# Patient Record
Sex: FEMALE | Race: NATIVE HAWAIIAN OR OTHER PACIFIC ISLANDER
[De-identification: names, ages, dates, MRNs, and addresses within clinical notes are randomized per-mention and may not be internally consistent; named-entity substitution may affect disease eponyms.]

---

## 2019-07-11 ENCOUNTER — HOSPITAL ENCOUNTER (OUTPATIENT)
Dept: HOSPITAL 67 - LABW | Age: 44
Discharge: HOME | End: 2019-07-11
Attending: PLASTIC SURGERY
Payer: COMMERCIAL

## 2019-07-11 DIAGNOSIS — Z01.812: Primary | ICD-10-CM

## 2019-07-11 DIAGNOSIS — Z01.810: ICD-10-CM

## 2019-07-11 DIAGNOSIS — Z01.818: ICD-10-CM

## 2019-07-11 LAB
PLATELET # BLD: 241 K/UL (ref 152–353)
POTASSIUM SERPL-SCNC: 4.5 MMOL/L (ref 3.6–5.2)
SODIUM SERPL-SCNC: 139 MMOL/L (ref 136–145)

## 2019-07-11 PROCEDURE — 36415 COLL VENOUS BLD VENIPUNCTURE: CPT

## 2019-07-11 PROCEDURE — 85027 COMPLETE CBC AUTOMATED: CPT

## 2019-07-11 PROCEDURE — 93005 ELECTROCARDIOGRAM TRACING: CPT

## 2019-07-11 PROCEDURE — 80048 BASIC METABOLIC PNL TOTAL CA: CPT

## 2020-07-25 ENCOUNTER — TELEPHONE ENCOUNTER (OUTPATIENT)
Dept: URBAN - METROPOLITAN AREA CLINIC 13 | Facility: CLINIC | Age: 45
End: 2020-07-25

## 2020-07-25 RX ORDER — BISMUTH SUBCITRATE POTASSIUM, METRONIDAZOLE, TETRACYCLINE HYDROCHLORIDE 140; 125; 125 MG/1; MG/1; MG/1
TAKE 3 CAPSULE EVERY 6 HOURS CAPSULE ORAL
Qty: 120 | Refills: 0 | OUTPATIENT
Start: 2016-09-13 | End: 2016-10-31

## 2020-07-25 RX ORDER — PANTOPRAZOLE SODIUM 40 MG
TAKE 1 TABLET TWICE DAILY 30 MINUTES BEFORE BREAKFAST AND DINNER TABLET, DELAYED RELEASE (ENTERIC COATED) ORAL
Qty: 20 | Refills: 0 | OUTPATIENT
End: 2016-10-31

## 2020-07-26 ENCOUNTER — TELEPHONE ENCOUNTER (OUTPATIENT)
Dept: URBAN - METROPOLITAN AREA CLINIC 13 | Facility: CLINIC | Age: 45
End: 2020-07-26

## 2020-07-26 RX ORDER — DICYCLOMINE HYDROCHLORIDE 10 MG/1
TAKE 1 CAPSULE EVERY 6 HOURS AS NEEDED CAPSULE ORAL
Qty: 120 | Refills: 3 | Status: ACTIVE | COMMUNITY
Start: 2016-09-13

## 2020-07-26 RX ORDER — LIRAGLUTIDE 6 MG/ML
INJECT 1.2 MG SUBCUTANEOUSLY EVERY DAY INJECTION SUBCUTANEOUS
Refills: 0 | Status: ACTIVE | COMMUNITY

## 2020-07-26 RX ORDER — METFORMIN HYDROCHLORIDE 1000 MG/1
TAKE 1 TABLET EVERY 12 HOURS DAILY TABLET, COATED ORAL
Refills: 0 | Status: ACTIVE | COMMUNITY

## 2020-07-26 RX ORDER — AMITRIPTYLINE HYDROCHLORIDE 10 MG/1
TAKE 1 TABLET EVERY NIGHT BEFORE BEDTIME TABLET, FILM COATED ORAL
Qty: 30 | Refills: 11 | Status: ACTIVE | COMMUNITY
Start: 2016-11-18

## 2020-09-02 ENCOUNTER — HOSPITAL ENCOUNTER (OUTPATIENT)
Dept: HOSPITAL 67 - LABW | Age: 45
LOS: 1 days | Discharge: HOME | End: 2020-09-03
Attending: OBSTETRICS & GYNECOLOGY
Payer: COMMERCIAL

## 2020-09-02 DIAGNOSIS — R53.83: Primary | ICD-10-CM

## 2020-09-02 PROCEDURE — 36415 COLL VENOUS BLD VENIPUNCTURE: CPT

## 2020-09-02 PROCEDURE — 84481 FREE ASSAY (FT-3): CPT

## 2020-11-11 ENCOUNTER — HOSPITAL ENCOUNTER (OUTPATIENT)
Dept: HOSPITAL 67 - LABW | Age: 45
Discharge: HOME | End: 2020-11-11
Attending: OBSTETRICS & GYNECOLOGY
Payer: COMMERCIAL

## 2020-11-11 DIAGNOSIS — R53.83: ICD-10-CM

## 2020-11-11 DIAGNOSIS — N95.1: ICD-10-CM

## 2020-11-11 DIAGNOSIS — R68.82: Primary | ICD-10-CM

## 2020-11-11 DIAGNOSIS — E55.9: ICD-10-CM

## 2020-11-11 PROCEDURE — 82670 ASSAY OF TOTAL ESTRADIOL: CPT

## 2020-11-11 PROCEDURE — 83001 ASSAY OF GONADOTROPIN (FSH): CPT

## 2020-11-11 PROCEDURE — 84481 FREE ASSAY (FT-3): CPT

## 2020-11-11 PROCEDURE — 82626 DEHYDROEPIANDROSTERONE: CPT

## 2020-11-11 PROCEDURE — 36415 COLL VENOUS BLD VENIPUNCTURE: CPT

## 2020-11-11 PROCEDURE — 84402 ASSAY OF FREE TESTOSTERONE: CPT

## 2020-11-11 PROCEDURE — 82306 VITAMIN D 25 HYDROXY: CPT

## 2020-11-11 PROCEDURE — 84144 ASSAY OF PROGESTERONE: CPT

## 2021-05-11 ENCOUNTER — HOSPITAL ENCOUNTER (OUTPATIENT)
Dept: HOSPITAL 67 - LABW | Age: 46
Discharge: HOME | End: 2021-05-11
Attending: OBSTETRICS & GYNECOLOGY
Payer: COMMERCIAL

## 2021-05-11 DIAGNOSIS — R53.83: ICD-10-CM

## 2021-05-11 DIAGNOSIS — E55.9: ICD-10-CM

## 2021-05-11 DIAGNOSIS — R68.82: Primary | ICD-10-CM

## 2021-05-11 DIAGNOSIS — N95.1: ICD-10-CM

## 2021-05-11 DIAGNOSIS — R63.5: ICD-10-CM

## 2021-05-11 DIAGNOSIS — Z13.220: ICD-10-CM

## 2021-05-11 LAB
LDLC SERPL-MCNC: 135 MG/DL (ref 0–?)
PLATELET # BLD: 237 K/UL (ref 152–353)
POTASSIUM SERPL-SCNC: 3.8 MMOL/L (ref 3.6–5.2)
SODIUM SERPL-SCNC: 140 MMOL/L (ref 136–145)

## 2021-05-11 PROCEDURE — 85027 COMPLETE CBC AUTOMATED: CPT

## 2021-05-11 PROCEDURE — 82306 VITAMIN D 25 HYDROXY: CPT

## 2021-05-11 PROCEDURE — 82607 VITAMIN B-12: CPT

## 2021-05-11 PROCEDURE — 82626 DEHYDROEPIANDROSTERONE: CPT

## 2021-05-11 PROCEDURE — 84144 ASSAY OF PROGESTERONE: CPT

## 2021-05-11 PROCEDURE — 80061 LIPID PANEL: CPT

## 2021-05-11 PROCEDURE — 82670 ASSAY OF TOTAL ESTRADIOL: CPT

## 2021-05-11 PROCEDURE — 83036 HEMOGLOBIN GLYCOSYLATED A1C: CPT

## 2021-05-11 PROCEDURE — 84443 ASSAY THYROID STIM HORMONE: CPT

## 2021-05-11 PROCEDURE — 84402 ASSAY OF FREE TESTOSTERONE: CPT

## 2021-05-11 PROCEDURE — 36415 COLL VENOUS BLD VENIPUNCTURE: CPT

## 2021-05-11 PROCEDURE — 84439 ASSAY OF FREE THYROXINE: CPT

## 2021-05-11 PROCEDURE — 80053 COMPREHEN METABOLIC PANEL: CPT

## 2021-05-11 PROCEDURE — 84481 FREE ASSAY (FT-3): CPT

## 2021-06-03 ENCOUNTER — HOSPITAL ENCOUNTER (OUTPATIENT)
Dept: HOSPITAL 67 - LABW | Age: 46
Discharge: HOME | End: 2021-06-03
Attending: OBSTETRICS & GYNECOLOGY
Payer: COMMERCIAL

## 2021-06-03 DIAGNOSIS — N95.1: Primary | ICD-10-CM

## 2021-06-03 PROCEDURE — 36415 COLL VENOUS BLD VENIPUNCTURE: CPT

## 2021-06-03 PROCEDURE — 83001 ASSAY OF GONADOTROPIN (FSH): CPT

## 2022-03-28 ENCOUNTER — LAB OUTSIDE AN ENCOUNTER (OUTPATIENT)
Dept: URBAN - METROPOLITAN AREA CLINIC 113 | Facility: CLINIC | Age: 47
End: 2022-03-28

## 2022-03-28 ENCOUNTER — OFFICE VISIT (OUTPATIENT)
Dept: URBAN - METROPOLITAN AREA CLINIC 113 | Facility: CLINIC | Age: 47
End: 2022-03-28
Payer: COMMERCIAL

## 2022-03-28 ENCOUNTER — DASHBOARD ENCOUNTERS (OUTPATIENT)
Age: 47
End: 2022-03-28

## 2022-03-28 VITALS
SYSTOLIC BLOOD PRESSURE: 125 MMHG | RESPIRATION RATE: 20 BRPM | DIASTOLIC BLOOD PRESSURE: 75 MMHG | WEIGHT: 147 LBS | HEART RATE: 98 BPM | TEMPERATURE: 97.5 F | HEIGHT: 67 IN | BODY MASS INDEX: 23.07 KG/M2

## 2022-03-28 DIAGNOSIS — K59.09 OTHER CONSTIPATION: ICD-10-CM

## 2022-03-28 DIAGNOSIS — R10.30 LOWER ABDOMINAL PAIN: ICD-10-CM

## 2022-03-28 DIAGNOSIS — Z86.010 HX OF ADENOMATOUS COLONIC POLYPS: ICD-10-CM

## 2022-03-28 DIAGNOSIS — R79.89 ELEVATED LFTS: ICD-10-CM

## 2022-03-28 PROCEDURE — 99204 OFFICE O/P NEW MOD 45 MIN: CPT | Performed by: PHYSICIAN ASSISTANT

## 2022-03-28 RX ORDER — DICYCLOMINE HYDROCHLORIDE 10 MG/1
2 CAPSULES CAPSULE ORAL
Qty: 180 | Refills: 2
Start: 2022-03-28 | End: 2022-06-26

## 2022-03-28 RX ORDER — METFORMIN HYDROCHLORIDE 1000 MG/1
TAKE 1 TABLET EVERY 12 HOURS DAILY TABLET, COATED ORAL
Refills: 0 | Status: ACTIVE | COMMUNITY

## 2022-03-28 RX ORDER — LIRAGLUTIDE 6 MG/ML
INJECT 1.2 MG SUBCUTANEOUSLY EVERY DAY INJECTION SUBCUTANEOUS
Refills: 0 | Status: ON HOLD | COMMUNITY

## 2022-03-28 RX ORDER — DICYCLOMINE HYDROCHLORIDE 10 MG/1
TAKE 1 CAPSULE EVERY 6 HOURS AS NEEDED CAPSULE ORAL
Qty: 120 | Refills: 3 | Status: ON HOLD | COMMUNITY
Start: 2016-09-13

## 2022-03-28 RX ORDER — AMITRIPTYLINE HYDROCHLORIDE 10 MG/1
TAKE 1 TABLET EVERY NIGHT BEFORE BEDTIME TABLET, FILM COATED ORAL
Qty: 30 | Refills: 11 | Status: ON HOLD | COMMUNITY
Start: 2016-11-18

## 2022-03-28 RX ORDER — THYROID, PORCINE 120 MG/1
1 TABLET ON AN EMPTY STOMACH TABLET ORAL ONCE A DAY
Status: ACTIVE | COMMUNITY

## 2022-03-28 NOTE — HPI-TODAY'S VISIT:
Ms. Schroeder is a 46-year-old woman with a history of diabetes mellitus, presenting for evaluation of abdominal pain. She has been followed by Dr. Dugan in the past for colon cancer screening.  Her last colonoscopy was performed on 10/31/2016 which was notable for a [place] bowel preparation scale score 6.  Findings included a 4 mm tubular adenoma.  She is recommended to undergo repeat surveillance in 2021.  She had an EGD performed on 8/30/2016 which was notable for normal esophagus, patchy mildly erythematous mucosa without bleeding in the gastric antrum and a normal duodenum.  Gastric biopsies revealed chronic active Helicobacter gastritis with H. pylori organisms identified, negative for intestinal metaplasia, dysplasia or malignancy.  She was treated with Pylera for 10 days.  He is also following her for diarrhea predominant irritable bowel syndrome and elevated liver function test.  Celiac testing has been unremarkable.  She had an MRI in 2016 which showed mild hepatic steatosis and hemorrhagic bilateral renal cysts.  Today she reports no complaints of intermittent mid lower abdominal pain.  She tells me that this pain has been going on for "a while".  Her symptoms are exacerbated with bowel movements.  She describes a "knot" in her stomach that is alleviated with defecation.  Denies any radiation.  Regarding her bowel habits, she reports constipation.  She tells me that she can go up to a week without having a bowel movement.  She intermittently takes magnesium citrate and milk of magnesia with slight improvement in her bowel habits.  She tells me that this cycle of constipation is followed by a couple of days of diarrhea.  She denies any red blood per rectum or melena.  There is no pain with defecation.  She believes her symptoms are related to irritable bowel syndrome.  She denies any relation to meals.  She denies any fevers, chills, nausea, vomiting, night sweats or unintentional weight loss.  She does not take NSAIDs on a daily basis.  She denies issues with heartburn or acid reflux.  No dysphagia or regurgitation. Interval history is notable for abdominal plasty 2 years ago in [place], [place].  She otherwise denies any significant changes in her past surgical history or medical history.

## 2022-03-28 NOTE — PHYSICAL EXAM GASTROINTESTINAL
Abdomen , soft, minimal lower abdominal discomfort with palpation, nondistended , no guarding or rigidity , no masses palpable , normal bowel sounds

## 2022-04-09 PROBLEM — 14760008: Status: ACTIVE | Noted: 2022-03-28

## 2022-04-09 PROBLEM — 863927004: Status: ACTIVE | Noted: 2022-04-09

## 2022-04-09 PROBLEM — 54586004: Status: ACTIVE | Noted: 2022-04-09

## 2022-04-27 ENCOUNTER — ERX REFILL RESPONSE (OUTPATIENT)
Dept: URBAN - METROPOLITAN AREA CLINIC 113 | Facility: CLINIC | Age: 47
End: 2022-04-27

## 2022-04-27 RX ORDER — DICYCLOMINE HYDROCHLORIDE 10 MG/1
2 CAPSULES CAPSULE ORAL
Qty: 180 | Refills: 2 | OUTPATIENT

## 2022-04-27 RX ORDER — DICYCLOMINE HYDROCHLORIDE 10 MG/1
2 CAPSULES ORALLY THREE TIMES A DAY AS NEEDED FOR ABDOMINAL PAIN 30 DAY(S) CAPSULE ORAL
Qty: 180 CAPSULE | Refills: 3 | OUTPATIENT

## 2022-05-05 PROBLEM — 429047008: Status: ACTIVE | Noted: 2022-03-28

## 2022-05-13 ENCOUNTER — TELEPHONE ENCOUNTER (OUTPATIENT)
Dept: URBAN - METROPOLITAN AREA CLINIC 113 | Facility: CLINIC | Age: 47
End: 2022-05-13

## 2022-05-17 ENCOUNTER — WEB ENCOUNTER (OUTPATIENT)
Dept: URBAN - METROPOLITAN AREA SURGERY CENTER 25 | Facility: SURGERY CENTER | Age: 47
End: 2022-05-17

## 2022-05-19 ENCOUNTER — OFFICE VISIT (OUTPATIENT)
Dept: URBAN - METROPOLITAN AREA SURGERY CENTER 25 | Facility: SURGERY CENTER | Age: 47
End: 2022-05-19
Payer: COMMERCIAL

## 2022-05-19 DIAGNOSIS — Z86.010 ADENOMAS PERSONAL HISTORY OF COLONIC POLYPS: ICD-10-CM

## 2022-05-19 PROCEDURE — G0105 COLORECTAL SCRN; HI RISK IND: HCPCS | Performed by: INTERNAL MEDICINE

## 2022-05-19 PROCEDURE — G8907 PT DOC NO EVENTS ON DISCHARG: HCPCS | Performed by: INTERNAL MEDICINE

## 2022-05-19 RX ORDER — METFORMIN HYDROCHLORIDE 1000 MG/1
TAKE 1 TABLET EVERY 12 HOURS DAILY TABLET, COATED ORAL
Refills: 0 | Status: ACTIVE | COMMUNITY

## 2022-05-19 RX ORDER — THYROID, PORCINE 120 MG/1
1 TABLET ON AN EMPTY STOMACH TABLET ORAL ONCE A DAY
Status: ACTIVE | COMMUNITY

## 2022-05-19 RX ORDER — AMITRIPTYLINE HYDROCHLORIDE 10 MG/1
TAKE 1 TABLET EVERY NIGHT BEFORE BEDTIME TABLET, FILM COATED ORAL
Qty: 30 | Refills: 11 | Status: ON HOLD | COMMUNITY
Start: 2016-11-18

## 2022-05-19 RX ORDER — LIRAGLUTIDE 6 MG/ML
INJECT 1.2 MG SUBCUTANEOUSLY EVERY DAY INJECTION SUBCUTANEOUS
Refills: 0 | Status: ON HOLD | COMMUNITY

## 2022-05-19 RX ORDER — DICYCLOMINE HYDROCHLORIDE 10 MG/1
2 CAPSULES ORALLY THREE TIMES A DAY AS NEEDED FOR ABDOMINAL PAIN 30 DAY(S) CAPSULE ORAL
Qty: 180 CAPSULE | Refills: 3 | Status: ACTIVE | COMMUNITY

## 2023-03-07 ENCOUNTER — HOSPITAL ENCOUNTER (EMERGENCY)
Dept: HOSPITAL 67 - ED | Age: 48
Discharge: HOME | End: 2023-03-07
Payer: COMMERCIAL

## 2023-03-07 VITALS — SYSTOLIC BLOOD PRESSURE: 120 MMHG | DIASTOLIC BLOOD PRESSURE: 74 MMHG

## 2023-03-07 VITALS — WEIGHT: 135 LBS | HEIGHT: 66 IN | BODY MASS INDEX: 21.69 KG/M2 | TEMPERATURE: 97.9 F

## 2023-03-07 DIAGNOSIS — E87.6: ICD-10-CM

## 2023-03-07 DIAGNOSIS — R00.2: Primary | ICD-10-CM

## 2023-03-07 LAB
PLATELET # BLD: 183 K/UL (ref 152–353)
POTASSIUM SERPL-SCNC: 3.4 MMOL/L (ref 3.6–5.2)
SODIUM SERPL-SCNC: 138 MMOL/L (ref 136–145)

## 2023-03-07 PROCEDURE — 99284 EMERGENCY DEPT VISIT MOD MDM: CPT

## 2023-03-07 PROCEDURE — 81002 URINALYSIS NONAUTO W/O SCOPE: CPT

## 2023-03-07 PROCEDURE — 83735 ASSAY OF MAGNESIUM: CPT

## 2023-03-07 PROCEDURE — 96361 HYDRATE IV INFUSION ADD-ON: CPT

## 2023-03-07 PROCEDURE — 80053 COMPREHEN METABOLIC PANEL: CPT

## 2023-03-07 PROCEDURE — 84484 ASSAY OF TROPONIN QUANT: CPT

## 2023-03-07 PROCEDURE — 81025 URINE PREGNANCY TEST: CPT

## 2023-03-07 PROCEDURE — 93005 ELECTROCARDIOGRAM TRACING: CPT

## 2023-03-07 PROCEDURE — 96360 HYDRATION IV INFUSION INIT: CPT

## 2023-03-07 PROCEDURE — 84443 ASSAY THYROID STIM HORMONE: CPT

## 2023-03-07 PROCEDURE — 85027 COMPLETE CBC AUTOMATED: CPT

## 2023-03-07 PROCEDURE — 36415 COLL VENOUS BLD VENIPUNCTURE: CPT
